# Patient Record
Sex: FEMALE | Race: ASIAN | NOT HISPANIC OR LATINO | URBAN - METROPOLITAN AREA
[De-identification: names, ages, dates, MRNs, and addresses within clinical notes are randomized per-mention and may not be internally consistent; named-entity substitution may affect disease eponyms.]

---

## 2017-04-09 ENCOUNTER — EMERGENCY (EMERGENCY)
Facility: HOSPITAL | Age: 27
LOS: 1 days | Discharge: HOME | End: 2017-04-09
Attending: EMERGENCY MEDICINE | Admitting: EMERGENCY MEDICINE
Payer: COMMERCIAL

## 2017-04-09 ENCOUNTER — INPATIENT (INPATIENT)
Facility: HOSPITAL | Age: 27
LOS: 1 days | Discharge: HOME | End: 2017-04-11
Attending: HOSPITALIST | Admitting: HOSPITALIST
Payer: COMMERCIAL

## 2017-04-09 VITALS
SYSTOLIC BLOOD PRESSURE: 134 MMHG | RESPIRATION RATE: 20 BRPM | OXYGEN SATURATION: 100 % | TEMPERATURE: 98 F | HEART RATE: 137 BPM | DIASTOLIC BLOOD PRESSURE: 81 MMHG

## 2017-04-09 VITALS
DIASTOLIC BLOOD PRESSURE: 85 MMHG | RESPIRATION RATE: 18 BRPM | OXYGEN SATURATION: 98 % | TEMPERATURE: 98 F | SYSTOLIC BLOOD PRESSURE: 121 MMHG | HEART RATE: 101 BPM

## 2017-04-09 DIAGNOSIS — J45.901 UNSPECIFIED ASTHMA WITH (ACUTE) EXACERBATION: ICD-10-CM

## 2017-04-09 LAB
ALBUMIN SERPL ELPH-MCNC: 4.6 G/DL — SIGNIFICANT CHANGE UP (ref 3.3–5)
ALP SERPL-CCNC: 73 U/L — SIGNIFICANT CHANGE UP (ref 40–120)
ALT FLD-CCNC: 31 U/L — SIGNIFICANT CHANGE UP (ref 4–33)
APTT BLD: 35.2 SEC — SIGNIFICANT CHANGE UP (ref 27.5–37.4)
AST SERPL-CCNC: 32 U/L — SIGNIFICANT CHANGE UP (ref 4–32)
BASOPHILS # BLD AUTO: 0.02 K/UL — SIGNIFICANT CHANGE UP (ref 0–0.2)
BASOPHILS NFR BLD AUTO: 0.1 % — SIGNIFICANT CHANGE UP (ref 0–2)
BILIRUB SERPL-MCNC: 0.2 MG/DL — SIGNIFICANT CHANGE UP (ref 0.2–1.2)
BUN SERPL-MCNC: 13 MG/DL — SIGNIFICANT CHANGE UP (ref 7–23)
CALCIUM SERPL-MCNC: 10 MG/DL — SIGNIFICANT CHANGE UP (ref 8.4–10.5)
CHLORIDE SERPL-SCNC: 99 MMOL/L — SIGNIFICANT CHANGE UP (ref 98–107)
CK SERPL-CCNC: 69 U/L — SIGNIFICANT CHANGE UP (ref 25–170)
CO2 SERPL-SCNC: 22 MMOL/L — SIGNIFICANT CHANGE UP (ref 22–31)
CREAT SERPL-MCNC: 0.65 MG/DL — SIGNIFICANT CHANGE UP (ref 0.5–1.3)
D DIMER BLD IA.RAPID-MCNC: 153 NG/ML — SIGNIFICANT CHANGE UP
EOSINOPHIL # BLD AUTO: 0.01 K/UL — SIGNIFICANT CHANGE UP (ref 0–0.5)
EOSINOPHIL NFR BLD AUTO: 0.1 % — SIGNIFICANT CHANGE UP (ref 0–6)
GLUCOSE SERPL-MCNC: 197 MG/DL — HIGH (ref 70–99)
HCT VFR BLD CALC: 44.7 % — SIGNIFICANT CHANGE UP (ref 34.5–45)
HGB BLD-MCNC: 15.4 G/DL — SIGNIFICANT CHANGE UP (ref 11.5–15.5)
IMM GRANULOCYTES NFR BLD AUTO: 0.2 % — SIGNIFICANT CHANGE UP (ref 0–1.5)
INR BLD: 1 — SIGNIFICANT CHANGE UP (ref 0.88–1.17)
LYMPHOCYTES # BLD AUTO: 1.19 K/UL — SIGNIFICANT CHANGE UP (ref 1–3.3)
LYMPHOCYTES # BLD AUTO: 7.1 % — LOW (ref 13–44)
MCHC RBC-ENTMCNC: 26.9 PG — LOW (ref 27–34)
MCHC RBC-ENTMCNC: 34.5 % — SIGNIFICANT CHANGE UP (ref 32–36)
MCV RBC AUTO: 78.1 FL — LOW (ref 80–100)
MONOCYTES # BLD AUTO: 0.59 K/UL — SIGNIFICANT CHANGE UP (ref 0–0.9)
MONOCYTES NFR BLD AUTO: 3.5 % — SIGNIFICANT CHANGE UP (ref 2–14)
NEUTROPHILS # BLD AUTO: 14.85 K/UL — HIGH (ref 1.8–7.4)
NEUTROPHILS NFR BLD AUTO: 89 % — HIGH (ref 43–77)
PLATELET # BLD AUTO: 264 K/UL — SIGNIFICANT CHANGE UP (ref 150–400)
PMV BLD: 10.4 FL — SIGNIFICANT CHANGE UP (ref 7–13)
POTASSIUM SERPL-MCNC: 4.1 MMOL/L — SIGNIFICANT CHANGE UP (ref 3.5–5.3)
POTASSIUM SERPL-SCNC: 4.1 MMOL/L — SIGNIFICANT CHANGE UP (ref 3.5–5.3)
PROT SERPL-MCNC: 8.9 G/DL — HIGH (ref 6–8.3)
PROTHROM AB SERPL-ACNC: 11.2 SEC — SIGNIFICANT CHANGE UP (ref 9.8–13.1)
RBC # BLD: 5.72 M/UL — HIGH (ref 3.8–5.2)
RBC # FLD: 14.8 % — HIGH (ref 10.3–14.5)
SODIUM SERPL-SCNC: 143 MMOL/L — SIGNIFICANT CHANGE UP (ref 135–145)
WBC # BLD: 16.69 K/UL — HIGH (ref 3.8–10.5)
WBC # FLD AUTO: 16.69 K/UL — HIGH (ref 3.8–10.5)

## 2017-04-09 PROCEDURE — 99283 EMERGENCY DEPT VISIT LOW MDM: CPT | Mod: 25

## 2017-04-09 PROCEDURE — 71020: CPT | Mod: 26

## 2017-04-09 RX ORDER — SODIUM CHLORIDE 9 MG/ML
1000 INJECTION INTRAMUSCULAR; INTRAVENOUS; SUBCUTANEOUS ONCE
Qty: 0 | Refills: 0 | Status: COMPLETED | OUTPATIENT
Start: 2017-04-09 | End: 2017-04-09

## 2017-04-09 RX ORDER — ACETAMINOPHEN 500 MG
650 TABLET ORAL ONCE
Qty: 0 | Refills: 0 | Status: COMPLETED | OUTPATIENT
Start: 2017-04-09 | End: 2017-04-09

## 2017-04-09 RX ORDER — IPRATROPIUM/ALBUTEROL SULFATE 18-103MCG
3 AEROSOL WITH ADAPTER (GRAM) INHALATION ONCE
Qty: 0 | Refills: 0 | Status: COMPLETED | OUTPATIENT
Start: 2017-04-09 | End: 2017-04-09

## 2017-04-09 RX ADMIN — Medication 40 MILLIGRAM(S): at 21:33

## 2017-04-09 RX ADMIN — Medication 3 MILLILITER(S): at 21:33

## 2017-04-09 RX ADMIN — SODIUM CHLORIDE 1000 MILLILITER(S): 9 INJECTION INTRAMUSCULAR; INTRAVENOUS; SUBCUTANEOUS at 21:30

## 2017-04-09 RX ADMIN — Medication 650 MILLIGRAM(S): at 14:47

## 2017-04-09 NOTE — ED ADULT NURSE REASSESSMENT NOTE - NS ED NURSE REASSESS COMMENT FT1
Pt. resting comfortably in bed, continues to report 10/10 L sided chest wall pain, MD made aware, pt. medicated as ordered.  Vital signs stable. Pt. awaiting admission to the hospital.  Will continue to monitor.

## 2017-04-09 NOTE — ED PROVIDER NOTE - OBJECTIVE STATEMENT
28yo F with a hx of asthma p/w SOB. Pt was here earlier today with 1d of URI sx's and R lower pleuritic pain. Pt had a negative CXR, was given tylenol and dc'd on albuterol. She is already on medrol dosepak and albuterol per urgent care. No abd pain, n/v/d. No fevers/chills. No hx of LE swelling, hx of DVT/PE, recent travel, calf pain. +OCPs (loestrin). Pt took one dose of albuterol at home after she got home from the ED but continued to have SOB, so she decided to come in. Received 2 duonebs with EMS. Pt is very anxious and states her mother  on a ventilator a year ago.

## 2017-04-09 NOTE — ED PROVIDER NOTE - ATTENDING CONTRIBUTION TO CARE
I performed a face to face evaluation of this patient and performed a history and physical examination on the patient.  I agree with the resident's history, physical examination, and plan of the patient. patient complaining of sob.  wheezing on exam. 2nd visit in day.  acute asthma exacerbation failed outpatient therapy. admit.

## 2017-04-09 NOTE — ED PROVIDER NOTE - DETAILS:
The scribe's documentation has been prepared under my direction and personally reviewed by me in its entirety. I confirm that the note above accurately reflects all work, treatment, procedures, and medical decision making performed by me (Dr. Mendoza).

## 2017-04-09 NOTE — ED ADULT NURSE NOTE - CHIEF COMPLAINT QUOTE
Pt recently diagnosed with pleuritis, now coming from home with asthma exacerbation with productive cough.  Pt given Albuterol x2 by EMS.  Pt tachycardic in triage.

## 2017-04-09 NOTE — ED ADULT NURSE NOTE - CHIEF COMPLAINT QUOTE
p/t with hx asthma c/o of cough cold and chest congestion for few days p/t currently being treated for bronchitis 2 neb treatments given by ems

## 2017-04-09 NOTE — ED PROVIDER NOTE - PLAN OF CARE
You were seen today for your asthma exacerbation and pleurisy.  Continue to use albuterol as needed for wheezing.  Take the prescribed steroids until you feel better.  Follow up with your primary care physician in 2-3 days for re-evaluation.  Call a pulmonologist from the referral list to be seen at the next available appointment.  RETURN TO THE EMERGENCY DEPARTMENT IMMEDIATELY IF YOU FEEL SEVERE SHORTNESS OF BREATH OR FOR ANY OTHER CONCERN.

## 2017-04-09 NOTE — ED PROVIDER NOTE - CARE PLAN
Principal Discharge DX:	Asthma  Secondary Diagnosis:	Pleurisy Principal Discharge DX:	Asthma  Instructions for follow-up, activity and diet:	You were seen today for your asthma exacerbation and pleurisy.  Continue to use albuterol as needed for wheezing.  Take the prescribed steroids until you feel better.  Follow up with your primary care physician in 2-3 days for re-evaluation.  Call a pulmonologist from the referral list to be seen at the next available appointment.  RETURN TO THE EMERGENCY DEPARTMENT IMMEDIATELY IF YOU FEEL SEVERE SHORTNESS OF BREATH OR FOR ANY OTHER CONCERN.  Secondary Diagnosis:	Pleurisy

## 2017-04-09 NOTE — ED PROVIDER NOTE - MEDICAL DECISION MAKING DETAILS
Pt p/w asthma exacerbation, second visit today. CXR negative for PNA. On medrol dosepak. O2 sat is ~95%. Will obtain basic labs, dimer, duonebs, steroids, CDU vs admit.

## 2017-04-09 NOTE — ED ADULT NURSE NOTE - OBJECTIVE STATEMENT
RN Facilitator. Pt received to room 9 for SOB, chest tightness and wheezing. Pt was seen in ED today for productive cough and pain 'right under R breast'. Pt diagnosed with pleuritis, discharged home and returned via ambulance for worsening SOB, chest tightness, and wheezing. Pt received awake, A&Ox4, pt denies lightheadedness, dizziness, weakness. Pt reporting SOB, pt intermittently tachypenic, breath sounds with wheezes and ronchi bilaterally. Pt denies chest pain, reporting 9/10 sharp pain under R breast, pt reports pain increases with movement and coughing. Pt denies N/V/D or dysuria. Cardiac monitor in place showing ST HR up to 140s, continuous pulse oxygenation showing 96% on RA.  MD TORSTEN Phillips aware. VS documented per flow. 20g PIV placed in R AC, labs drawn and sent per orders. NS bolus infusing per orders. Father at bedside. Safety maintained, report endorsed to Primary RN Nena.

## 2017-04-09 NOTE — ED PROVIDER NOTE - MEDICAL DECISION MAKING DETAILS
27y F with PMHx of asthma presents with URI symptoms. Pleuritic chest pain likely pleurisy vs costochondritis. Doubt PE as no hx of DVT or PE. no evidence of DVT on exam. Obtain CXR to r/o pneumothorax, Tylenol for pain and reassess. 27y F with PMHx of asthma presents with URI symptoms. Pleuritic chest pain likely pleurisy vs costochondritis. Doubt PE as no hx of DVT or PE. no evidence of DVT on exam, ttp, no hypoxia, no tachypnea. Obtain CXR to r/o pneumothorax, Tylenol for pain and reassess.

## 2017-04-09 NOTE — ED PROVIDER NOTE - OBJECTIVE STATEMENT
27y F with PMHx of asthma presents to the ED with dry cough, nasal congestion, and sore throat x 3 days. States symptoms are gradually worsening despite seeing PMD and starting on steroid, Amoxicillin, and antitussive agent. Pt states cough is dry and she has clear nasal congestion. No difficulty swallowing food. Pt endorses right sided chest pain when coughing located under right breast. Was not able to start taking steroids until today because she forgot medication. Denies hx of DVT or PE, leg pain, leg swelling, recent immobilization, surgery, or long travel.

## 2017-04-09 NOTE — ED PROVIDER NOTE - NS ED MD SCRIBE ATTENDING SCRIBE SECTIONS
DISPOSITION/PAST MEDICAL/SURGICAL/SOCIAL HISTORY/HIV/HISTORY OF PRESENT ILLNESS/REVIEW OF SYSTEMS/VITAL SIGNS( Pullset)/PHYSICAL EXAM

## 2017-04-10 ENCOUNTER — TRANSCRIPTION ENCOUNTER (OUTPATIENT)
Age: 27
End: 2017-04-10

## 2017-04-10 DIAGNOSIS — E86.0 DEHYDRATION: ICD-10-CM

## 2017-04-10 DIAGNOSIS — R65.10 SYSTEMIC INFLAMMATORY RESPONSE SYNDROME (SIRS) OF NON-INFECTIOUS ORIGIN WITHOUT ACUTE ORGAN DYSFUNCTION: ICD-10-CM

## 2017-04-10 DIAGNOSIS — D72.829 ELEVATED WHITE BLOOD CELL COUNT, UNSPECIFIED: ICD-10-CM

## 2017-04-10 DIAGNOSIS — R07.9 CHEST PAIN, UNSPECIFIED: ICD-10-CM

## 2017-04-10 DIAGNOSIS — Z98.891 HISTORY OF UTERINE SCAR FROM PREVIOUS SURGERY: Chronic | ICD-10-CM

## 2017-04-10 DIAGNOSIS — A41.89 OTHER SPECIFIED SEPSIS: ICD-10-CM

## 2017-04-10 DIAGNOSIS — J45.901 UNSPECIFIED ASTHMA WITH (ACUTE) EXACERBATION: ICD-10-CM

## 2017-04-10 LAB

## 2017-04-10 PROCEDURE — 99223 1ST HOSP IP/OBS HIGH 75: CPT | Mod: GC

## 2017-04-10 RX ORDER — ENOXAPARIN SODIUM 100 MG/ML
40 INJECTION SUBCUTANEOUS EVERY 24 HOURS
Qty: 0 | Refills: 0 | Status: DISCONTINUED | OUTPATIENT
Start: 2017-04-10 | End: 2017-04-10

## 2017-04-10 RX ORDER — LIDOCAINE 4 G/100G
1 CREAM TOPICAL DAILY
Qty: 0 | Refills: 0 | Status: DISCONTINUED | OUTPATIENT
Start: 2017-04-10 | End: 2017-04-11

## 2017-04-10 RX ORDER — KETOROLAC TROMETHAMINE 30 MG/ML
30 SYRINGE (ML) INJECTION ONCE
Qty: 0 | Refills: 0 | Status: DISCONTINUED | OUTPATIENT
Start: 2017-04-10 | End: 2017-04-10

## 2017-04-10 RX ORDER — IPRATROPIUM/ALBUTEROL SULFATE 18-103MCG
3 AEROSOL WITH ADAPTER (GRAM) INHALATION EVERY 4 HOURS
Qty: 0 | Refills: 0 | Status: DISCONTINUED | OUTPATIENT
Start: 2017-04-10 | End: 2017-04-11

## 2017-04-10 RX ORDER — IBUPROFEN 200 MG
600 TABLET ORAL EVERY 8 HOURS
Qty: 0 | Refills: 0 | Status: DISCONTINUED | OUTPATIENT
Start: 2017-04-10 | End: 2017-04-10

## 2017-04-10 RX ORDER — SODIUM CHLORIDE 0.65 %
1 AEROSOL, SPRAY (ML) NASAL
Qty: 0 | Refills: 0 | Status: DISCONTINUED | OUTPATIENT
Start: 2017-04-10 | End: 2017-04-11

## 2017-04-10 RX ORDER — DIPHENHYDRAMINE HCL 50 MG
50 CAPSULE ORAL ONCE
Qty: 0 | Refills: 0 | Status: COMPLETED | OUTPATIENT
Start: 2017-04-10 | End: 2017-04-10

## 2017-04-10 RX ORDER — LIDOCAINE 4 G/100G
1 CREAM TOPICAL ONCE
Qty: 0 | Refills: 0 | Status: COMPLETED | OUTPATIENT
Start: 2017-04-10 | End: 2017-04-10

## 2017-04-10 RX ORDER — IPRATROPIUM/ALBUTEROL SULFATE 18-103MCG
3 AEROSOL WITH ADAPTER (GRAM) INHALATION EVERY 6 HOURS
Qty: 0 | Refills: 0 | Status: DISCONTINUED | OUTPATIENT
Start: 2017-04-10 | End: 2017-04-10

## 2017-04-10 RX ORDER — BUDESONIDE AND FORMOTEROL FUMARATE DIHYDRATE 160; 4.5 UG/1; UG/1
2 AEROSOL RESPIRATORY (INHALATION)
Qty: 0 | Refills: 0 | Status: DISCONTINUED | OUTPATIENT
Start: 2017-04-10 | End: 2017-04-11

## 2017-04-10 RX ADMIN — Medication 30 MILLIGRAM(S): at 12:55

## 2017-04-10 RX ADMIN — Medication 3 MILLILITER(S): at 17:45

## 2017-04-10 RX ADMIN — Medication 3 MILLILITER(S): at 08:45

## 2017-04-10 RX ADMIN — LIDOCAINE 1 PATCH: 4 CREAM TOPICAL at 08:29

## 2017-04-10 RX ADMIN — Medication 50 MILLIGRAM(S): at 22:31

## 2017-04-10 RX ADMIN — LIDOCAINE 1 PATCH: 4 CREAM TOPICAL at 17:59

## 2017-04-10 RX ADMIN — LIDOCAINE 1 PATCH: 4 CREAM TOPICAL at 03:30

## 2017-04-10 RX ADMIN — Medication 100 MILLIGRAM(S): at 09:36

## 2017-04-10 RX ADMIN — Medication 100 MILLIGRAM(S): at 03:30

## 2017-04-10 RX ADMIN — Medication 1 SPRAY(S): at 21:50

## 2017-04-10 RX ADMIN — Medication 30 MILLIGRAM(S): at 13:10

## 2017-04-10 RX ADMIN — BUDESONIDE AND FORMOTEROL FUMARATE DIHYDRATE 2 PUFF(S): 160; 4.5 AEROSOL RESPIRATORY (INHALATION) at 21:51

## 2017-04-10 RX ADMIN — Medication 3 MILLILITER(S): at 14:27

## 2017-04-10 RX ADMIN — Medication 3 MILLILITER(S): at 03:11

## 2017-04-10 RX ADMIN — ENOXAPARIN SODIUM 40 MILLIGRAM(S): 100 INJECTION SUBCUTANEOUS at 05:30

## 2017-04-10 RX ADMIN — Medication 100 MILLIGRAM(S): at 21:49

## 2017-04-10 RX ADMIN — Medication 3 MILLILITER(S): at 21:20

## 2017-04-10 RX ADMIN — Medication 40 MILLIGRAM(S): at 05:30

## 2017-04-10 NOTE — DISCHARGE NOTE ADULT - PLAN OF CARE
Stabilization of symptoms. -Please continue to use your newly prescribed Symbicort inhaler 2 times per day.   -Please follow up with your primary care doctor and setup an appointment with the Pulmonary clinic in for your asthma.   -Please return back to the ED if your symptoms worsen. -Please continue to use your newly prescribed Symbicort inhaler 2 times per day.   -You were given a prescription for a nebulizer and treatments; please use this as needed for wheezing and/or difficulty breathing up to 4 times in one day.  -Please follow up with your primary care doctor and setup an appointment with the Pulmonary clinic in for your asthma.   -Please return back to the ED if your symptoms worsen.

## 2017-04-10 NOTE — H&P ADULT. - PROBLEM SELECTOR PLAN 2
CP likely musculoskeletal etiology. Unlikely PE given negative D-dimer. Given young age & lack of other comorbidities, do not suspect cardiac etiology.  -EKG pending  -Pain control Pt likely in asthma exacerbation given persistent cough, SOB. Likely triggered by viral URI given +sick contacts & no evidence of PNA on CXR.  -DuoNebs standing  -Prednisone taper  -Peak flows daily  -RVP pending  -May benefit from inhaled corticosteroid to minimize exacerbation Pt likely in asthma exacerbation given persistent cough, SOB. Likely triggered by viral URI given +sick contacts & non compliance with maintenance Advair no evidence of PNA on CXR.  -DuoNebs standing  -Prednisone taper  -Peak flows daily  -RVP (+)  -Dolores --> Symbicort while inpatient   -frequent pulse-ox checks

## 2017-04-10 NOTE — H&P ADULT. - RESPIRATORY COMMENTS
Mild, scattered expiratory wheeze throughout both lung fields scattered fine expiratory wheeze throughout both lung fields

## 2017-04-10 NOTE — H&P ADULT. - LYMPHATIC
posterior cervical R/supraclavicular L/anterior cervical R/posterior cervical L/supraclavicular R/anterior cervical L

## 2017-04-10 NOTE — DISCHARGE NOTE ADULT - CARE PLAN
Principal Discharge DX:	Asthma exacerbation  Goal:	Stabilization of symptoms.  Instructions for follow-up, activity and diet:	-Please continue to use your newly prescribed Symbicort inhaler 2 times per day.   -Please follow up with your primary care doctor and setup an appointment with the Pulmonary clinic in for your asthma.   -Please return back to the ED if your symptoms worsen. Principal Discharge DX:	Asthma exacerbation  Goal:	Stabilization of symptoms.  Instructions for follow-up, activity and diet:	-Please continue to use your newly prescribed Symbicort inhaler 2 times per day.   -You were given a prescription for a nebulizer and treatments; please use this as needed for wheezing and/or difficulty breathing up to 4 times in one day.  -Please follow up with your primary care doctor and setup an appointment with the Pulmonary clinic in for your asthma.   -Please return back to the ED if your symptoms worsen.

## 2017-04-10 NOTE — DISCHARGE NOTE ADULT - CONDITIONS AT DISCHARGE
Patient a&ox4, VSS, no acute distress noted. No c/o of pain at this time. Skin intact. Patient to be d/c home.

## 2017-04-10 NOTE — H&P ADULT. - NEGATIVE OPHTHALMOLOGIC SYMPTOMS
no blurred vision L/no blurred vision R no diplopia/no blurred vision L/no photophobia/no blurred vision R

## 2017-04-10 NOTE — DISCHARGE NOTE ADULT - MEDICATION SUMMARY - MEDICATIONS TO TAKE
I will START or STAY ON the medications listed below when I get home from the hospital:    Nebulizer  -- 1 kit  -- Indication: For Asthma exacerbation    predniSONE 20 mg oral tablet  -- 2 tab(s) by mouth once a day  -- It is very important that you take or use this exactly as directed.  Do not skip doses or discontinue unless directed by your doctor.  Obtain medical advice before taking any non-prescription drugs as some may affect the action of this medication.  Take with food or milk.    -- Indication: For Asthma exacerbation    naproxen 500 mg oral tablet  -- 1 tab(s) by mouth 2 times a day, As Needed -for moderate pain  -- Check with your doctor before becoming pregnant.  May cause drowsiness or dizziness.  Obtain medical advice before taking any non-prescription drugs as some may affect the action of this medication.  Take with food or milk.    -- Indication: For Chest pain    budesonide-formoterol 160 mcg-4.5 mcg/inh inhalation aerosol  -- 2 puff(s) inhaled 2 times a day  -- Indication: For Asthma exacerbation    albuterol 90 mcg/inh inhalation aerosol  -- 2 puff(s) inhaled 4 times a day, As Needed  -- Indication: For Asthma exacerbation    albuterol-ipratropium 2.5 mg-0.5 mg/3 mL inhalation solution  -- 3 milliliter(s) inhaled every 4 hours as needed  -- Indication: For Asthma exacerbation    guaiFENesin 100 mg/5 mL oral liquid  -- 5 milliliter(s) by mouth every 6 hours, As needed, Cough  -- Indication: For Coughing    Low-Ogestrel  --  by mouth   -- Indication: For Birth control I will START or STAY ON the medications listed below when I get home from the hospital:    Nebulizer  -- 1 kit  -- Indication: For Asthma exacerbation    predniSONE 20 mg oral tablet  -- 2 tab(s) by mouth once a day x 3 days  -- It is very important that you take or use this exactly as directed.  Do not skip doses or discontinue unless directed by your doctor.  Obtain medical advice before taking any non-prescription drugs as some may affect the action of this medication.  Take with food or milk.    -- Indication: For Asthma exacerbation    naproxen 500 mg oral tablet  -- 1 tab(s) by mouth 2 times a day, As Needed -for moderate pain  -- Check with your doctor before becoming pregnant.  May cause drowsiness or dizziness.  Obtain medical advice before taking any non-prescription drugs as some may affect the action of this medication.  Take with food or milk.    -- Indication: For Chest pain    albuterol 90 mcg/inh inhalation aerosol  -- 2 puff(s) inhaled 4 times a day, As Needed  -- Indication: For Asthma exacerbation    albuterol-ipratropium 2.5 mg-0.5 mg/3 mL inhalation solution  -- 3 milliliter(s) inhaled every 4 hours as needed  -- Indication: For Asthma exacerbation    budesonide-formoterol 160 mcg-4.5 mcg/inh inhalation aerosol  -- 2 puff(s) inhaled 2 times a day  -- Indication: For Asthma exacerbation    guaiFENesin 100 mg/5 mL oral liquid  -- 5 milliliter(s) by mouth every 6 hours, As needed, Cough  -- Indication: For Coughing    Low-Ogestrel  --  by mouth   -- Indication: For Birth control    homatropine-HYDROcodone 1.5 mg-5 mg/5 mL oral syrup  -- 5 milliliter(s) by mouth every 6 hours, As needed, Cough MDD:20 ml  -- Indication: For Coughinh I will START or STAY ON the medications listed below when I get home from the hospital:    Nebulizer  -- 1 kit  -- Indication: For Asthma exacerbation    predniSONE 20 mg oral tablet  -- 2 tab(s) by mouth once a day x 3 days  -- It is very important that you take or use this exactly as directed.  Do not skip doses or discontinue unless directed by your doctor.  Obtain medical advice before taking any non-prescription drugs as some may affect the action of this medication.  Take with food or milk.    -- Indication: For Asthma exacerbation    naproxen 500 mg oral tablet  -- 1 tab(s) by mouth 2 times a day, As Needed -for moderate pain  -- Check with your doctor before becoming pregnant.  May cause drowsiness or dizziness.  Obtain medical advice before taking any non-prescription drugs as some may affect the action of this medication.  Take with food or milk.    -- Indication: For Chest pain    ProAir HFA 90 mcg/inh inhalation aerosol  -- 2 puff(s) inhaled every 6 hours as needed  -- For inhalation only.  It is very important that you take or use this exactly as directed.  Do not skip doses or discontinue unless directed by your doctor.  Obtain medical advice before taking any non-prescription drugs as some may affect the action of this medication.  Shake well before use.    -- Indication: For Asthma exacerbation    Symbicort 160 mcg-4.5 mcg/inh inhalation aerosol  -- 2 puff(s) inhaled 2 times a day  -- Check with your doctor before becoming pregnant.  For inhalation only.  Rinse mouth thoroughly after use.    -- Indication: For Asthma exacerbation    albuterol-ipratropium 2.5 mg-0.5 mg/3 mL inhalation solution  -- 3 milliliter(s) inhaled every 4 hours as needed  -- Indication: For Asthma exacerbation    guaiFENesin 100 mg/5 mL oral liquid  -- 5 milliliter(s) by mouth every 6 hours, As needed, Cough  -- Indication: For Coughing    Low-Ogestrel  --  by mouth   -- Indication: For Birth control    homatropine-HYDROcodone 1.5 mg-5 mg/5 mL oral syrup  -- 5 milliliter(s) by mouth every 6 hours, As needed, Cough MDD:20 ml  -- Indication: For Chest pain

## 2017-04-10 NOTE — H&P ADULT. - PROBLEM SELECTOR PLAN 3
Leukocytosis likely 2/2 steroid use. No fevers or evidence of PNA.  -Trend CBC CP likely musculoskeletal etiology. Unlikely PE given negative D-dimer. Given young age & lack of other comorbidities, do not suspect cardiac etiology.  -EKG pending  -Pain control CP likely musculoskeletal etiology given that reproducible. Unlikely PE given negative D-dimer. Given young age & lack of other comorbidities, do not suspect cardiac etiology. No evidence of pneumothorax.   -EKG no acute changes   -Pain control  -f/u official CXR report

## 2017-04-10 NOTE — DISCHARGE NOTE ADULT - CARE PROVIDER_API CALL
Clinic, Pulmonary  [address] [address]  Phone: [phone]  Fax: [phone]    - Maegan Newberry), Critical Care Medicine; Internal Medicine; Pulmonary Disease; Sleep Medicine  [address] [place] [address]  Phone: [phone]  Fax: [phone]

## 2017-04-10 NOTE — H&P ADULT. - PROBLEM SELECTOR PLAN 1
Pt likely in asthma exacerbation given persistent cough, SOB. Likely triggered by viral URI.  -DuoNebs standing  -Prednisone taper  -Peak flows daily Pt likely in asthma exacerbation given persistent cough, SOB. Likely triggered by viral URI given +sick contacts & no evidence of PNA on CXR.  -DuoNebs standing  -Prednisone taper  -Peak flows daily Pt likely in asthma exacerbation given persistent cough, SOB. Likely triggered by viral URI given +sick contacts & no evidence of PNA on CXR.  -DuoNebs standing  -Prednisone taper  -Peak flows daily  -RVP pending  -May benefit from inhaled corticosteroid to minimize exacerbation Meets SIRS criteria given leukocytosis & tachycardia.  Leukocytosis likely related to steroids use, tachycardia likely multifactorial from DuoNebs, respiratory distress.  -Monitor off abx for now  -Check UA leukocytosis & tachycardia, RVP (+)  -Monitor off abx   -Check UA  Supportive care leukocytosis & tachycardia, RVP (+)  -Monitor off abx   -Check UA  -Supportive care

## 2017-04-10 NOTE — DISCHARGE NOTE ADULT - HOSPITAL COURSE
Admission course  28yo Female hx asthma p/w non-productive cough, SOB, R-sided CP for 1-2 days.  Pt reports dry cough, nasal congestion, SOB that started on 4/8.  She went to an urgent care center  where she was started on steroid taper (?methylprednisone), amoxicillin & anti-tussives.  Pt reports that the cough became worse that night & she developed pain under her R-breast.  Due to persistent SOB & the CP, she came to ED on 4/9. She had CXR that was negative & was d/c'ed on naproxen. Pt returned to ER the same night due to persistent SOB & CP. She denies any fevers, chills abd pain, N, V.  Her brother who lives at home w/ her has been having a cough & she believes he might have been diagnosed w/ the flu. Pt was diagnosed w/ asthma based on clinical sx at age 17.  She has been hospitalized for exacerbation once every 1-2 years.  She has never been intubated before.  She typically uses albuterol PRN 1-2/week. Of note, the pt says that she has Advair at home but uses it "sometimes", did not know that it is a daily medication. Unable to recall the name of her PMD. The pt is in menstruation period currently.    ED course: T 98.3, , /81, RR 20, O2 100% RA.  Labs remarkable for WBC 16.7, D-dimer 153. +RVP for rhinovirus. CXR: clear lungs bilaterally. Received 1L NS, DuoNeb, prednisone 40mg, percocet.   Chest XRAY:       Hospital Course  The patient was continued on DuoNebs Q4H, prednisone 40 mg QD and Symbicort. The patient continued to complain of coughing and wheezing however improved from admission. Her chest pain was initially treated with lidocaine patch and percocet however, the patient did not experience any relief with this regimen. Lidocaine patch and percocet were discontinued and the patient was given Toradol 30 mg IVP x1 for right sided musculoskeletal chest pain. The patient was discharged home after improvement in her symptoms with Symbicort and instructed to take Naproxen that was previously prescribed from the ED. The patient was also instructed to follow up with her PMD and to make an appointment with the Pulmonology clinic in order to undergo further pulmonary testing. Admission course  28yo Female hx asthma p/w non-productive cough, SOB, R-sided CP for 1-2 days.  Pt reports dry cough, nasal congestion, SOB that started on 4/8.  She went to an urgent care center  where she was started on steroid taper (?methylprednisone), amoxicillin & anti-tussives.  Pt reports that the cough became worse that night & she developed pain under her R-breast.  Due to persistent SOB & the CP, she came to ED on 4/9. She had CXR that was negative & was d/c'ed on naproxen. Pt returned to ER the same night due to persistent SOB & CP. She denies any fevers, chills abd pain, N, V.  Her brother who lives at home w/ her has been having a cough & she believes he might have been diagnosed w/ the flu. Pt was diagnosed w/ asthma based on clinical sx at age 17.  She has been hospitalized for exacerbation once every 1-2 years.  She has never been intubated before.  She typically uses albuterol PRN 1-2/week. Of note, the pt says that she has Advair at home but uses it "sometimes", did not know that it is a daily medication. Unable to recall the name of her PMD. The pt is in menstruation period currently.    ED course: T 98.3, , /81, RR 20, O2 100% RA.  Labs remarkable for WBC 16.7, D-dimer 153. +RVP for rhinovirus. CXR: clear lungs bilaterally. Received 1L NS, DuoNeb, prednisone 40mg, percocet.   Chest XRAY:       Hospital Course  The patient was continued on DuoNebs Q4H, prednisone 40 mg QD and Symbicort. The patient continued to complain of coughing and wheezing however improved from admission. Her chest pain was initially treated with lidocaine patch and percocet however, the patient did not experience any relief with this regimen. Lidocaine patch and percocet were discontinued and the patient was given Toradol 30 mg IVP x1 for right sided musculoskeletal chest pain. Naproxen PRN was added. The patient was discharged home after improvement in her symptoms with Symbicort and instructed to take Naproxen that was previously prescribed from the ED. The patient was also instructed to follow up with her PMD and to make an appointment with the Pulmonology clinic in order to undergo further pulmonary testing. Admission course  26yo Female hx asthma p/w non-productive cough, SOB, R-sided CP for 1-2 days.  Pt reports dry cough, nasal congestion, SOB that started on 4/8.  She went to an urgent care center  where she was started on steroid taper (?methylprednisone), amoxicillin & anti-tussives.  Pt reports that the cough became worse that night & she developed pain under her R-breast.  Due to persistent SOB & the CP, she came to ED on 4/9. She had CXR that was negative & was d/c'ed on naproxen. Pt returned to ER the same night due to persistent SOB & CP. She denies any fevers, chills abd pain, N, V.  Her brother who lives at home w/ her has been having a cough & she believes he might have been diagnosed w/ the flu. Pt was diagnosed w/ asthma based on clinical sx at age 17.  She has been hospitalized for exacerbation once every 1-2 years.  She has never been intubated before.  She typically uses albuterol PRN 1-2/week. Of note, the pt says that she has Advair at home but uses it "sometimes", did not know that it is a daily medication. Unable to recall the name of her PMD. The pt is in menstruation period currently.    ED course: T 98.3, , /81, RR 20, O2 100% RA.  Labs remarkable for WBC 16.7, D-dimer 153. +RVP for rhinovirus. CXR: clear lungs bilaterally. Received 1L NS, DuoNeb, prednisone 40mg, percocet.   Chest XRAY:       Hospital Course  The patient was continued on DuoNebs Q4H, prednisone 40 mg QD and Symbicort. The patient continued to complain of coughing and wheezing however improved from admission. Her chest pain was initially treated with lidocaine patch and percocet however, the patient did not experience any relief with this regimen. Lidocaine patch and percocet were discontinued and the patient was given Toradol 30 mg IVP x1 for right sided musculoskeletal chest pain. Naproxen PRN was added. The patient was discharged home after improvement in her symptoms with Symbicort and instructed to take Naproxen that was previously prescribed from the ED. She was prescribed a 3 day supply of prednisone for a total of 5 days on prednisone 40 mg. The patient was also instructed to follow up with her PMD and to make an appointment with the Pulmonology clinic in order to undergo further pulmonary testing. Admission course  26yo Female hx asthma p/w non-productive cough, SOB, R-sided CP for 1-2 days.  Pt reports dry cough, nasal congestion, SOB that started on 4/8.  She went to an urgent care center  where she was started on steroid taper (?methylprednisone), amoxicillin & anti-tussives.  Pt reports that the cough became worse that night & she developed pain under her R-breast.  Due to persistent SOB & the CP, she came to ED on 4/9. She had CXR that was negative & was d/c'ed on naproxen. Pt returned to ER the same night due to persistent SOB & CP. She denies any fevers, chills abd pain, N, V.  Her brother who lives at home w/ her has been having a cough & she believes he might have been diagnosed w/ the flu. Pt was diagnosed w/ asthma based on clinical sx at age 17.  She has been hospitalized for exacerbation once every 1-2 years.  She has never been intubated before.  She typically uses albuterol PRN 1-2/week. Of note, the pt says that she has Advair at home but uses it "sometimes", did not know that it is a daily medication. Unable to recall the name of her PMD. The pt is in menstruation period currently.    ED course: T 98.3, , /81, RR 20, O2 100% RA.  Labs remarkable for WBC 16.7, D-dimer 153. +RVP for rhinovirus. CXR: clear lungs bilaterally. Received 1L NS, DuoNeb, prednisone 40mg, percocet.   Chest XRAY:       Hospital Course  The patient was continued on DuoNebs Q4H, prednisone 40 mg QD and Symbicort. The patient continued to complain of coughing and wheezing however improved from admission. The patient was given lidocaine patch and percocet for her R sided chest pain however, she did not experience any relief with this regimen. Lidocaine patch and percocet were discontinued and the patient was given Toradol 30 mg IVP x1 however with no relief. Naproxen Q8H PRN was added however the patient did not require any PRNs. On the day of discharge, she reported decreased cough, improved breathing and unchanged right sided chest pain; however on exam there was no tenderness elicited on palpation. Given the improvement of pain, reproducibility on exam, non-elevated D-dimer and stable VS, chest CT/CTA was not performed due to low probability of PE. The patient was discharged home with prescriptions for 3 more days of prednisone 40 mg, Symbicort, albuterol inhaler, duonebs and a nebulizer. The patient was instructed to take Naproxen and Robitussin that was previously prescribed from the ED. She was also also instructed to follow up with her PMD and to make an appointment with the Pulmonology clinic in order to undergo formal pulmonary function testing.

## 2017-04-10 NOTE — DISCHARGE NOTE ADULT - PROVIDER TOKENS
FREE:[LAST:[Clinic],FIRST:[Pulmonary],PHONE:[phone]],FAX:[phone]    -],ADDRESS:[[address] [place] [address]]] TOKEN:'[id number]'

## 2017-04-10 NOTE — DISCHARGE NOTE ADULT - MEDICATION SUMMARY - MEDICATIONS TO STOP TAKING
I will STOP taking the medications listed below when I get home from the hospital:    cephalexin 500 mg oral capsule  -- 1 cap(s) by mouth 2 times a day  -- Finish all this medication unless otherwise directed by prescriber.    Advair Diskus 250 mcg-50 mcg inhalation powder  -- 1 puff(s) inhaled 2 times a day

## 2017-04-10 NOTE — H&P ADULT. - ASSESSMENT
27F hx asthma p/w persistent SOB despite steroids, albuterol, returning to hospital after ED visit on 4/10 for persistent SOB, admitted for asthma exacerbation 27F hx asthma p/w cough, SOB, R-sided CP, s/p urgent care visit on 4/8 where she was given abx & steroids, returning to hospital after ED visit on 4/10 for persistent SOB & CP, admitted for asthma exacerbation 27F hx asthma p/w cough, SOB, R-sided CP, s/p urgent care visit on 4/8 where she was given abx & steroids, returning to hospital after ED visit on 4/10 for persistent SOB & CP a/w viral sepsis c/b dehydration and asthma exacerbation due to combination of viral URI and noncompliance with Advair; 28yo Female with hx asthma p/w cough, SOB, R-sided CP, s/p urgent care visit on 4/8 where she was given abx & steroids, returning to hospital after ED visit on 4/10 for persistent SOB & CP a/w viral sepsis c/b dehydration and asthma exacerbation due to combination of viral URI and noncompliance with Advair;

## 2017-04-10 NOTE — H&P ADULT. - FAMILY HISTORY
No pertinent family history in first degree relatives Mother  Still living? No  Family history of non-Hodgkin's lymphoma, Age at diagnosis: Age Unknown

## 2017-04-10 NOTE — DISCHARGE NOTE ADULT - CARE PROVIDERS DIRECT ADDRESSES
,DirectAddress_Unknown,DirectAddress_Unknown ,[email or web address],DirectAddress_Unknown

## 2017-04-10 NOTE — H&P ADULT. - MUSCULOSKELETAL
details… detailed exam no joint swelling/no calf tenderness/normal strength/no joint erythema/ROM intact/no joint warmth

## 2017-04-10 NOTE — DISCHARGE NOTE ADULT - PATIENT PORTAL LINK FT
“You can access the Follow[facility] Patient Portal, offered by [facility], by registering with the following website: [email or web address]”

## 2017-04-10 NOTE — H&P ADULT. - HISTORY OF PRESENT ILLNESS
27F hx asthma p/w persistent SOB.  Pt has been having non-productive cough, nasal congestion & SOB for several days.  She went to urgent care center where she was started on sterids, abx & anti-tussives.  Pt also reporting R-sided CP that is worse w/ coughing.  She was seen in ED earlier in day, had negative CXR & was d/c'ed on albuterol.  Pt returned to ER due to persistent SOB. She denies any fevers, chills abd pain, N, V.     ED course: T 98.3, , /81, RR 20, O2 100% RA.  Labs remarkable for WBC 16.7.  Received 1L NS, DuoNeb, prednisone 40mg, percocet. 27F hx asthma p/w non-productive cough, SOB, R-sided CP for 1-2 days.  Pt reports dry cough, nasal congestion, SOB that started on 4/8.  She went to an urgent care center  where she was started on steroid taper (?methylprednisone), amoxicillin & anti-tussives.  Pt reports that the cough became worse that night & she developed pain under her R-breast.  Due to persistent SOB & the CP, she came to ED on 4/9. She had CXR that was negative & was d/c'ed on albuterol. Pt returned to ER the same night due to persistent SOB & CP. She denies any fevers, chills abd pain, N, V.  Her brother who lives at home w/ her has been having a cough & she believes he might have been diagnosed w/ the flu.  Pt was diagnosed w/ asthma based on clinical sx at age 17.  She has been hospitalized for exacerbation once every 1-2 years.  She has never been intubated before.     ED course: T 98.3, , /81, RR 20, O2 100% RA.  Labs remarkable for WBC 16.7.  Received 1L NS, DuoNeb, prednisone 40mg, percocet. 27F hx asthma p/w non-productive cough, SOB, R-sided CP for 1-2 days.  Pt reports dry cough, nasal congestion, SOB that started on 4/8.  She went to an urgent care center  where she was started on steroid taper (?methylprednisone), amoxicillin & anti-tussives.  Pt reports that the cough became worse that night & she developed pain under her R-breast.  Due to persistent SOB & the CP, she came to ED on 4/9. She had CXR that was negative & was d/c'ed on naproxen. Pt returned to ER the same night due to persistent SOB & CP. She denies any fevers, chills abd pain, N, V.  Her brother who lives at home w/ her has been having a cough & she believes he might have been diagnosed w/ the flu.  Pt was diagnosed w/ asthma based on clinical sx at age 17.  She has been hospitalized for exacerbation once every 1-2 years.  She has never been intubated before.     ED course: T 98.3, , /81, RR 20, O2 100% RA.  Labs remarkable for WBC 16.7, D-dimer 153.  Received 1L NS, DuoNeb, prednisone 40mg, percocet. 27F hx asthma p/w non-productive cough, SOB, R-sided CP for 1-2 days.  Pt reports dry cough, nasal congestion, SOB that started on 4/8.  She went to an urgent care center  where she was started on steroid taper (?methylprednisone), amoxicillin & anti-tussives.  Pt reports that the cough became worse that night & she developed pain under her R-breast.  Due to persistent SOB & the CP, she came to ED on 4/9. She had CXR that was negative & was d/c'ed on naproxen. Pt returned to ER the same night due to persistent SOB & CP. She denies any fevers, chills abd pain, N, V.  Her brother who lives at home w/ her has been having a cough & she believes he might have been diagnosed w/ the flu.      Pt was diagnosed w/ asthma based on clinical sx at age 17.  She has been hospitalized for exacerbation once every 1-2 years.  She has never been intubated before.  She typically uses albuterol PRN 1-2/week & is not on any maintenance meds.    ED course: T 98.3, , /81, RR 20, O2 100% RA.  Labs remarkable for WBC 16.7, D-dimer 153.  Received 1L NS, DuoNeb, prednisone 40mg, percocet. 28yo Female hx asthma p/w non-productive cough, SOB, R-sided CP for 1-2 days.  Pt reports dry cough, nasal congestion, SOB that started on 4/8.  She went to an urgent care center  where she was started on steroid taper (?methylprednisone), amoxicillin & anti-tussives.  Pt reports that the cough became worse that night & she developed pain under her R-breast.  Due to persistent SOB & the CP, she came to ED on 4/9. She had CXR that was negative & was d/c'ed on naproxen. Pt returned to ER the same night due to persistent SOB & CP. She denies any fevers, chills abd pain, N, V.  Her brother who lives at home w/ her has been having a cough & she believes he might have been diagnosed w/ the flu.      Pt was diagnosed w/ asthma based on clinical sx at age 17.  She has been hospitalized for exacerbation once every 1-2 years.  She has never been intubated before.  She typically uses albuterol PRN 1-2/week & is not on any maintenance meds.    ED course: T 98.3, , /81, RR 20, O2 100% RA.  Labs remarkable for WBC 16.7, D-dimer 153.  Received 1L NS, DuoNeb, prednisone 40mg, percocet. 26yo Female hx asthma p/w non-productive cough, SOB, R-sided CP for 1-2 days.  Pt reports dry cough, nasal congestion, SOB that started on 4/8.  She went to an urgent care center  where she was started on steroid taper (?methylprednisone), amoxicillin & anti-tussives.  Pt reports that the cough became worse that night & she developed pain under her R-breast.  Due to persistent SOB & the CP, she came to ED on 4/9. She had CXR that was negative & was d/c'ed on naproxen. Pt returned to ER the same night due to persistent SOB & CP. She denies any fevers, chills abd pain, N, V.  Her brother who lives at home w/ her has been having a cough & she believes he might have been diagnosed w/ the flu. Pt was diagnosed w/ asthma based on clinical sx at age 17.  She has been hospitalized for exacerbation once every 1-2 years.  She has never been intubated before.  She typically uses albuterol PRN 1-2/week. Of note, the pt says that she has Advair at home but uses it "sometimes", did not know that it is a daily medication.     ED course: T 98.3, , /81, RR 20, O2 100% RA.  Labs remarkable for WBC 16.7, D-dimer 153.  Received 1L NS, DuoNeb, prednisone 40mg, percocet. 26yo Female hx asthma p/w non-productive cough, SOB, R-sided CP for 1-2 days.  Pt reports dry cough, nasal congestion, SOB that started on 4/8.  She went to an urgent care center  where she was started on steroid taper (?methylprednisone), amoxicillin & anti-tussives.  Pt reports that the cough became worse that night & she developed pain under her R-breast.  Due to persistent SOB & the CP, she came to ED on 4/9. She had CXR that was negative & was d/c'ed on naproxen. Pt returned to ER the same night due to persistent SOB & CP. She denies any fevers, chills abd pain, N, V.  Her brother who lives at home w/ her has been having a cough & she believes he might have been diagnosed w/ the flu. Pt was diagnosed w/ asthma based on clinical sx at age 17.  She has been hospitalized for exacerbation once every 1-2 years.  She has never been intubated before.  She typically uses albuterol PRN 1-2/week. Of note, the pt says that she has Advair at home but uses it "sometimes", did not know that it is a daily medication. Unable to recall the name of her PMD. The pt is in menstruation period currently.    ED course: T 98.3, , /81, RR 20, O2 100% RA.  Labs remarkable for WBC 16.7, D-dimer 153.  Received 1L NS, DuoNeb, prednisone 40mg, percocet.

## 2017-04-11 VITALS
HEART RATE: 75 BPM | RESPIRATION RATE: 18 BRPM | TEMPERATURE: 97 F | OXYGEN SATURATION: 98 % | SYSTOLIC BLOOD PRESSURE: 108 MMHG | DIASTOLIC BLOOD PRESSURE: 78 MMHG

## 2017-04-11 PROCEDURE — 99239 HOSP IP/OBS DSCHRG MGMT >30: CPT

## 2017-04-11 RX ORDER — IPRATROPIUM/ALBUTEROL SULFATE 18-103MCG
3 AEROSOL WITH ADAPTER (GRAM) INHALATION
Qty: 30 | Refills: 1 | OUTPATIENT
Start: 2017-04-11

## 2017-04-11 RX ORDER — ALBUTEROL 90 UG/1
2 AEROSOL, METERED ORAL
Qty: 1 | Refills: 1 | OUTPATIENT
Start: 2017-04-11

## 2017-04-11 RX ORDER — ALBUTEROL 90 UG/1
2 AEROSOL, METERED ORAL
Qty: 1 | Refills: 2 | OUTPATIENT
Start: 2017-04-11

## 2017-04-11 RX ORDER — BUDESONIDE AND FORMOTEROL FUMARATE DIHYDRATE 160; 4.5 UG/1; UG/1
2 AEROSOL RESPIRATORY (INHALATION)
Qty: 1 | Refills: 1 | OUTPATIENT
Start: 2017-04-11

## 2017-04-11 RX ORDER — DOCUSATE SODIUM 100 MG
100 CAPSULE ORAL DAILY
Qty: 0 | Refills: 0 | Status: DISCONTINUED | OUTPATIENT
Start: 2017-04-11 | End: 2017-04-11

## 2017-04-11 RX ORDER — FLUTICASONE PROPIONATE AND SALMETEROL 50; 250 UG/1; UG/1
1 POWDER ORAL; RESPIRATORY (INHALATION)
Qty: 1 | Refills: 1 | OUTPATIENT
Start: 2017-04-11

## 2017-04-11 RX ORDER — ALBUTEROL 90 UG/1
2 AEROSOL, METERED ORAL
Qty: 0 | Refills: 0 | COMMUNITY

## 2017-04-11 RX ORDER — FLUTICASONE PROPIONATE AND SALMETEROL 50; 250 UG/1; UG/1
1 POWDER ORAL; RESPIRATORY (INHALATION)
Qty: 0 | Refills: 0 | COMMUNITY

## 2017-04-11 RX ADMIN — Medication 3 MILLILITER(S): at 09:15

## 2017-04-11 RX ADMIN — Medication 3 MILLILITER(S): at 01:10

## 2017-04-11 RX ADMIN — Medication 100 MILLIGRAM(S): at 01:21

## 2017-04-11 RX ADMIN — Medication 100 MILLIGRAM(S): at 12:02

## 2017-04-11 RX ADMIN — Medication 3 MILLILITER(S): at 12:49

## 2017-04-11 RX ADMIN — Medication 3 MILLILITER(S): at 05:04

## 2017-04-11 RX ADMIN — BUDESONIDE AND FORMOTEROL FUMARATE DIHYDRATE 2 PUFF(S): 160; 4.5 AEROSOL RESPIRATORY (INHALATION) at 09:15

## 2017-04-11 RX ADMIN — Medication 40 MILLIGRAM(S): at 05:56

## 2017-04-11 RX ADMIN — LIDOCAINE 1 PATCH: 4 CREAM TOPICAL at 11:03

## 2017-11-20 ENCOUNTER — EMERGENCY (EMERGENCY)
Facility: HOSPITAL | Age: 27
LOS: 1 days | Discharge: HOME | End: 2017-11-20
Attending: EMERGENCY MEDICINE | Admitting: EMERGENCY MEDICINE
Payer: COMMERCIAL

## 2017-11-20 VITALS
HEART RATE: 87 BPM | RESPIRATION RATE: 16 BRPM | DIASTOLIC BLOOD PRESSURE: 72 MMHG | SYSTOLIC BLOOD PRESSURE: 108 MMHG | TEMPERATURE: 98 F | OXYGEN SATURATION: 98 %

## 2017-11-20 VITALS
RESPIRATION RATE: 18 BRPM | SYSTOLIC BLOOD PRESSURE: 119 MMHG | OXYGEN SATURATION: 99 % | HEART RATE: 107 BPM | DIASTOLIC BLOOD PRESSURE: 68 MMHG | TEMPERATURE: 98 F

## 2017-11-20 DIAGNOSIS — Z98.891 HISTORY OF UTERINE SCAR FROM PREVIOUS SURGERY: Chronic | ICD-10-CM

## 2017-11-20 LAB
ALBUMIN SERPL ELPH-MCNC: 4 G/DL — SIGNIFICANT CHANGE UP (ref 3.3–5)
ALP SERPL-CCNC: 52 U/L — SIGNIFICANT CHANGE UP (ref 40–120)
ALT FLD-CCNC: 16 U/L — SIGNIFICANT CHANGE UP (ref 4–33)
APPEARANCE UR: SIGNIFICANT CHANGE UP
APTT BLD: 33 SEC — SIGNIFICANT CHANGE UP (ref 27.5–37.4)
AST SERPL-CCNC: 16 U/L — SIGNIFICANT CHANGE UP (ref 4–32)
BACTERIA # UR AUTO: SIGNIFICANT CHANGE UP
BASOPHILS # BLD AUTO: 0.03 K/UL — SIGNIFICANT CHANGE UP (ref 0–0.2)
BASOPHILS NFR BLD AUTO: 0.3 % — SIGNIFICANT CHANGE UP (ref 0–2)
BILIRUB SERPL-MCNC: 0.3 MG/DL — SIGNIFICANT CHANGE UP (ref 0.2–1.2)
BILIRUB UR-MCNC: NEGATIVE — SIGNIFICANT CHANGE UP
BLD GP AB SCN SERPL QL: NEGATIVE — SIGNIFICANT CHANGE UP
BLOOD UR QL VISUAL: HIGH
BUN SERPL-MCNC: 8 MG/DL — SIGNIFICANT CHANGE UP (ref 7–23)
CALCIUM SERPL-MCNC: 9.1 MG/DL — SIGNIFICANT CHANGE UP (ref 8.4–10.5)
CHLORIDE SERPL-SCNC: 100 MMOL/L — SIGNIFICANT CHANGE UP (ref 98–107)
CO2 SERPL-SCNC: 20 MMOL/L — LOW (ref 22–31)
COLOR SPEC: YELLOW — SIGNIFICANT CHANGE UP
CREAT SERPL-MCNC: 0.58 MG/DL — SIGNIFICANT CHANGE UP (ref 0.5–1.3)
EOSINOPHIL # BLD AUTO: 0.09 K/UL — SIGNIFICANT CHANGE UP (ref 0–0.5)
EOSINOPHIL NFR BLD AUTO: 0.8 % — SIGNIFICANT CHANGE UP (ref 0–6)
GLUCOSE SERPL-MCNC: 122 MG/DL — HIGH (ref 70–99)
GLUCOSE UR-MCNC: NEGATIVE — SIGNIFICANT CHANGE UP
HCG SERPL-ACNC: SIGNIFICANT CHANGE UP MIU/ML
HCT VFR BLD CALC: 40.7 % — SIGNIFICANT CHANGE UP (ref 34.5–45)
HGB BLD-MCNC: 13.8 G/DL — SIGNIFICANT CHANGE UP (ref 11.5–15.5)
IMM GRANULOCYTES # BLD AUTO: 0.03 # — SIGNIFICANT CHANGE UP
IMM GRANULOCYTES NFR BLD AUTO: 0.3 % — SIGNIFICANT CHANGE UP (ref 0–1.5)
INR BLD: 1.05 — SIGNIFICANT CHANGE UP (ref 0.88–1.17)
KETONES UR-MCNC: SIGNIFICANT CHANGE UP
LEUKOCYTE ESTERASE UR-ACNC: NEGATIVE — SIGNIFICANT CHANGE UP
LYMPHOCYTES # BLD AUTO: 2.45 K/UL — SIGNIFICANT CHANGE UP (ref 1–3.3)
LYMPHOCYTES # BLD AUTO: 21.4 % — SIGNIFICANT CHANGE UP (ref 13–44)
MCHC RBC-ENTMCNC: 26 PG — LOW (ref 27–34)
MCHC RBC-ENTMCNC: 33.9 % — SIGNIFICANT CHANGE UP (ref 32–36)
MCV RBC AUTO: 76.6 FL — LOW (ref 80–100)
MONOCYTES # BLD AUTO: 0.56 K/UL — SIGNIFICANT CHANGE UP (ref 0–0.9)
MONOCYTES NFR BLD AUTO: 4.9 % — SIGNIFICANT CHANGE UP (ref 2–14)
MUCOUS THREADS # UR AUTO: SIGNIFICANT CHANGE UP
NEUTROPHILS # BLD AUTO: 8.3 K/UL — HIGH (ref 1.8–7.4)
NEUTROPHILS NFR BLD AUTO: 72.3 % — SIGNIFICANT CHANGE UP (ref 43–77)
NITRITE UR-MCNC: NEGATIVE — SIGNIFICANT CHANGE UP
NRBC # FLD: 0 — SIGNIFICANT CHANGE UP
PH UR: 6.5 — SIGNIFICANT CHANGE UP (ref 4.6–8)
PLATELET # BLD AUTO: 246 K/UL — SIGNIFICANT CHANGE UP (ref 150–400)
PMV BLD: 10.5 FL — SIGNIFICANT CHANGE UP (ref 7–13)
POTASSIUM SERPL-MCNC: 3.9 MMOL/L — SIGNIFICANT CHANGE UP (ref 3.5–5.3)
POTASSIUM SERPL-SCNC: 3.9 MMOL/L — SIGNIFICANT CHANGE UP (ref 3.5–5.3)
PROT SERPL-MCNC: 7.5 G/DL — SIGNIFICANT CHANGE UP (ref 6–8.3)
PROT UR-MCNC: 30 — HIGH
PROTHROM AB SERPL-ACNC: 11.8 SEC — SIGNIFICANT CHANGE UP (ref 9.8–13.1)
RBC # BLD: 5.31 M/UL — HIGH (ref 3.8–5.2)
RBC # FLD: 14.6 % — HIGH (ref 10.3–14.5)
RBC CASTS # UR COMP ASSIST: >50 — HIGH (ref 0–?)
RH IG SCN BLD-IMP: NEGATIVE — SIGNIFICANT CHANGE UP
SODIUM SERPL-SCNC: 137 MMOL/L — SIGNIFICANT CHANGE UP (ref 135–145)
SP GR SPEC: 1.01 — SIGNIFICANT CHANGE UP (ref 1–1.03)
SQUAMOUS # UR AUTO: SIGNIFICANT CHANGE UP
UROBILINOGEN FLD QL: NORMAL E.U. — SIGNIFICANT CHANGE UP (ref 0.1–0.2)
WBC # BLD: 11.46 K/UL — HIGH (ref 3.8–10.5)
WBC # FLD AUTO: 11.46 K/UL — HIGH (ref 3.8–10.5)
WBC UR QL: HIGH (ref 0–?)

## 2017-11-20 PROCEDURE — 99284 EMERGENCY DEPT VISIT MOD MDM: CPT

## 2017-11-20 PROCEDURE — 76830 TRANSVAGINAL US NON-OB: CPT | Mod: 26

## 2017-11-20 PROCEDURE — 76705 ECHO EXAM OF ABDOMEN: CPT | Mod: 26

## 2017-11-20 NOTE — ED ADULT TRIAGE NOTE - CHIEF COMPLAINT QUOTE
Pt 6 weeks pregnant c/o vaginal spotting. Pt reports mild RUQ abdominal pain, denies dizziness, SOB.

## 2017-11-20 NOTE — ED PROVIDER NOTE - OBJECTIVE STATEMENT
26 y/o female, 6 weeks pregnant by dates,  with 2 terminations, p/w vaginal bleeding and abd cramping. Sxs began this afternoon. Pt notes bright blood. No trauma, no fever, no chills, and no urinary complaints. No changes in medication. No domestic violence.

## 2017-11-20 NOTE — ED ADULT TRIAGE NOTE - TEMPERATURE IN FAHRENHEIT (DEGREES F)
Pt called with symptoms chest pain, more so when breathing, and was unsure if it was heart related. Pt transferred to CC RN for Triage.   98.4

## 2017-11-30 ENCOUNTER — APPOINTMENT (OUTPATIENT)
Dept: MATERNAL FETAL MEDICINE | Facility: CLINIC | Age: 27
End: 2017-11-30
Payer: COMMERCIAL

## 2017-11-30 ENCOUNTER — ASOB RESULT (OUTPATIENT)
Age: 27
End: 2017-11-30

## 2017-11-30 PROCEDURE — G0108 DIAB MANAGE TRN  PER INDIV: CPT

## 2017-12-04 ENCOUNTER — OTHER (OUTPATIENT)
Age: 27
End: 2017-12-04

## 2017-12-04 LAB — HBA1C MFR BLD HPLC: 6.9 %

## 2017-12-08 ENCOUNTER — ASOB RESULT (OUTPATIENT)
Age: 27
End: 2017-12-08

## 2017-12-08 ENCOUNTER — APPOINTMENT (OUTPATIENT)
Dept: ANTEPARTUM | Facility: CLINIC | Age: 27
End: 2017-12-08
Payer: COMMERCIAL

## 2017-12-08 ENCOUNTER — APPOINTMENT (OUTPATIENT)
Dept: MATERNAL FETAL MEDICINE | Facility: CLINIC | Age: 27
End: 2017-12-08
Payer: COMMERCIAL

## 2017-12-08 VITALS
SYSTOLIC BLOOD PRESSURE: 110 MMHG | BODY MASS INDEX: 38.24 KG/M2 | HEIGHT: 56 IN | HEART RATE: 76 BPM | WEIGHT: 170 LBS | DIASTOLIC BLOOD PRESSURE: 68 MMHG

## 2017-12-08 DIAGNOSIS — Z82.49 FAMILY HISTORY OF ISCHEMIC HEART DISEASE AND OTHER DISEASES OF THE CIRCULATORY SYSTEM: ICD-10-CM

## 2017-12-08 DIAGNOSIS — Z78.9 OTHER SPECIFIED HEALTH STATUS: ICD-10-CM

## 2017-12-08 DIAGNOSIS — Z80.7 FAMILY HISTORY OF OTHER MALIGNANT NEOPLASMS OF LYMPHOID, HEMATOPOIETIC AND RELATED TISSUES: ICD-10-CM

## 2017-12-08 DIAGNOSIS — Z83.3 FAMILY HISTORY OF DIABETES MELLITUS: ICD-10-CM

## 2017-12-08 PROCEDURE — 99243 OFF/OP CNSLTJ NEW/EST LOW 30: CPT

## 2017-12-08 PROCEDURE — 76817 TRANSVAGINAL US OBSTETRIC: CPT

## 2017-12-08 RX ORDER — VALACYCLOVIR 1 G/1
1 TABLET, FILM COATED ORAL
Qty: 10 | Refills: 0 | Status: COMPLETED | COMMUNITY
Start: 2017-11-13

## 2017-12-08 RX ORDER — CEPHALEXIN 500 MG/1
500 CAPSULE ORAL
Qty: 14 | Refills: 0 | Status: COMPLETED | COMMUNITY
Start: 2017-11-10

## 2017-12-08 RX ORDER — NORETHINDRONE ACETATE AND ETHINYL ESTRADIOL, ETHINYL ESTRADIOL AND FERROUS FUMARATE 1MG-10(24)
1 MG-10 MCG / KIT ORAL
Qty: 84 | Refills: 0 | Status: COMPLETED | COMMUNITY
Start: 2017-10-24

## 2017-12-08 RX ORDER — ACYCLOVIR 50 MG/G
5 OINTMENT TOPICAL
Qty: 30 | Refills: 0 | Status: COMPLETED | COMMUNITY
Start: 2017-11-13

## 2017-12-14 ENCOUNTER — APPOINTMENT (OUTPATIENT)
Dept: MATERNAL FETAL MEDICINE | Facility: CLINIC | Age: 27
End: 2017-12-14

## 2017-12-21 ENCOUNTER — APPOINTMENT (OUTPATIENT)
Dept: MATERNAL FETAL MEDICINE | Facility: CLINIC | Age: 27
End: 2017-12-21
Payer: COMMERCIAL

## 2017-12-21 ENCOUNTER — ASOB RESULT (OUTPATIENT)
Age: 27
End: 2017-12-21

## 2017-12-21 DIAGNOSIS — O24.419 GESTATIONAL DIABETES MELLITUS IN PREGNANCY, UNSPECIFIED CONTROL: ICD-10-CM

## 2017-12-21 PROCEDURE — G0108 DIAB MANAGE TRN  PER INDIV: CPT

## 2017-12-28 ENCOUNTER — OTHER (OUTPATIENT)
Age: 27
End: 2017-12-28

## 2018-01-03 ENCOUNTER — APPOINTMENT (OUTPATIENT)
Dept: ANTEPARTUM | Facility: CLINIC | Age: 28
End: 2018-01-03
Payer: COMMERCIAL

## 2018-01-03 ENCOUNTER — ASOB RESULT (OUTPATIENT)
Age: 28
End: 2018-01-03

## 2018-01-03 PROCEDURE — 76813 OB US NUCHAL MEAS 1 GEST: CPT

## 2018-01-03 PROCEDURE — 76801 OB US < 14 WKS SINGLE FETUS: CPT

## 2018-01-11 ENCOUNTER — APPOINTMENT (OUTPATIENT)
Dept: MATERNAL FETAL MEDICINE | Facility: CLINIC | Age: 28
End: 2018-01-11
Payer: COMMERCIAL

## 2018-01-11 ENCOUNTER — ASOB RESULT (OUTPATIENT)
Age: 28
End: 2018-01-11

## 2018-01-11 ENCOUNTER — APPOINTMENT (OUTPATIENT)
Dept: ANTEPARTUM | Facility: CLINIC | Age: 28
End: 2018-01-11
Payer: COMMERCIAL

## 2018-01-11 VITALS
BODY MASS INDEX: 36.98 KG/M2 | HEART RATE: 72 BPM | OXYGEN SATURATION: 98 % | DIASTOLIC BLOOD PRESSURE: 62 MMHG | WEIGHT: 164.38 LBS | RESPIRATION RATE: 18 BRPM | SYSTOLIC BLOOD PRESSURE: 100 MMHG | HEIGHT: 56 IN

## 2018-01-11 DIAGNOSIS — J45.909 UNSPECIFIED ASTHMA, UNCOMPLICATED: ICD-10-CM

## 2018-01-11 PROCEDURE — 99242 OFF/OP CONSLTJ NEW/EST SF 20: CPT

## 2018-01-11 PROCEDURE — 76801 OB US < 14 WKS SINGLE FETUS: CPT

## 2018-01-11 RX ORDER — FLUTICASONE PROPIONATE AND SALMETEROL 50; 100 UG/1; UG/1
100-50 POWDER RESPIRATORY (INHALATION)
Refills: 0 | Status: DISCONTINUED | COMMUNITY
Start: 2017-12-08 | End: 2018-01-11

## 2018-01-29 ENCOUNTER — RX RENEWAL (OUTPATIENT)
Age: 28
End: 2018-01-29

## 2018-02-02 ENCOUNTER — RX RENEWAL (OUTPATIENT)
Age: 28
End: 2018-02-02

## 2018-02-05 ENCOUNTER — MEDICATION RENEWAL (OUTPATIENT)
Age: 28
End: 2018-02-05

## 2018-02-08 ENCOUNTER — ASOB RESULT (OUTPATIENT)
Age: 28
End: 2018-02-08

## 2018-02-08 ENCOUNTER — APPOINTMENT (OUTPATIENT)
Dept: MATERNAL FETAL MEDICINE | Facility: CLINIC | Age: 28
End: 2018-02-08
Payer: COMMERCIAL

## 2018-02-08 VITALS — BODY MASS INDEX: 37.66 KG/M2 | WEIGHT: 167.4 LBS | HEIGHT: 56 IN

## 2018-02-08 PROCEDURE — G0108 DIAB MANAGE TRN  PER INDIV: CPT

## 2018-03-01 ENCOUNTER — APPOINTMENT (OUTPATIENT)
Dept: MATERNAL FETAL MEDICINE | Facility: CLINIC | Age: 28
End: 2018-03-01
Payer: COMMERCIAL

## 2018-03-01 ENCOUNTER — APPOINTMENT (OUTPATIENT)
Dept: ANTEPARTUM | Facility: CLINIC | Age: 28
End: 2018-03-01
Payer: COMMERCIAL

## 2018-03-01 ENCOUNTER — ASOB RESULT (OUTPATIENT)
Age: 28
End: 2018-03-01

## 2018-03-01 VITALS
HEART RATE: 90 BPM | WEIGHT: 168 LBS | RESPIRATION RATE: 16 BRPM | SYSTOLIC BLOOD PRESSURE: 106 MMHG | OXYGEN SATURATION: 97 % | DIASTOLIC BLOOD PRESSURE: 64 MMHG | BODY MASS INDEX: 37.67 KG/M2

## 2018-03-01 PROCEDURE — 76811 OB US DETAILED SNGL FETUS: CPT

## 2018-03-01 PROCEDURE — 99242 OFF/OP CONSLTJ NEW/EST SF 20: CPT

## 2018-03-15 ENCOUNTER — APPOINTMENT (OUTPATIENT)
Dept: MATERNAL FETAL MEDICINE | Facility: CLINIC | Age: 28
End: 2018-03-15
Payer: COMMERCIAL

## 2018-03-15 ENCOUNTER — ASOB RESULT (OUTPATIENT)
Age: 28
End: 2018-03-15

## 2018-03-15 VITALS — BODY MASS INDEX: 38.24 KG/M2 | HEIGHT: 56 IN | WEIGHT: 170 LBS

## 2018-03-15 PROCEDURE — G0108 DIAB MANAGE TRN  PER INDIV: CPT

## 2018-04-12 ENCOUNTER — ASOB RESULT (OUTPATIENT)
Age: 28
End: 2018-04-12

## 2018-04-12 ENCOUNTER — APPOINTMENT (OUTPATIENT)
Dept: MATERNAL FETAL MEDICINE | Facility: CLINIC | Age: 28
End: 2018-04-12
Payer: COMMERCIAL

## 2018-04-12 ENCOUNTER — APPOINTMENT (OUTPATIENT)
Dept: ANTEPARTUM | Facility: CLINIC | Age: 28
End: 2018-04-12
Payer: COMMERCIAL

## 2018-04-12 VITALS
DIASTOLIC BLOOD PRESSURE: 62 MMHG | WEIGHT: 172 LBS | SYSTOLIC BLOOD PRESSURE: 104 MMHG | RESPIRATION RATE: 16 BRPM | OXYGEN SATURATION: 98 % | BODY MASS INDEX: 38.69 KG/M2 | HEART RATE: 92 BPM | HEIGHT: 56 IN

## 2018-04-12 PROCEDURE — 99243 OFF/OP CNSLTJ NEW/EST LOW 30: CPT

## 2018-04-12 PROCEDURE — 76816 OB US FOLLOW-UP PER FETUS: CPT

## 2018-04-17 ENCOUNTER — OUTPATIENT (OUTPATIENT)
Dept: OUTPATIENT SERVICES | Age: 28
LOS: 1 days | Discharge: HOME | End: 2018-04-17

## 2018-04-17 DIAGNOSIS — Z98.891 HISTORY OF UTERINE SCAR FROM PREVIOUS SURGERY: Chronic | ICD-10-CM

## 2018-04-19 ENCOUNTER — APPOINTMENT (OUTPATIENT)
Dept: PEDIATRIC CARDIOLOGY | Facility: CLINIC | Age: 28
End: 2018-04-19
Payer: COMMERCIAL

## 2018-04-19 PROCEDURE — 93325 DOPPLER ECHO COLOR FLOW MAPG: CPT | Mod: 59

## 2018-04-19 PROCEDURE — 99241 OFFICE CONSULTATION NEW/ESTAB PATIENT 15 MIN: CPT | Mod: 25

## 2018-04-19 PROCEDURE — 76827 ECHO EXAM OF FETAL HEART: CPT

## 2018-04-19 PROCEDURE — 76825 ECHO EXAM OF FETAL HEART: CPT

## 2018-04-19 PROCEDURE — 76820 UMBILICAL ARTERY ECHO: CPT

## 2018-05-03 ENCOUNTER — APPOINTMENT (OUTPATIENT)
Dept: MATERNAL FETAL MEDICINE | Facility: CLINIC | Age: 28
End: 2018-05-03
Payer: COMMERCIAL

## 2018-05-03 ENCOUNTER — ASOB RESULT (OUTPATIENT)
Age: 28
End: 2018-05-03

## 2018-05-03 VITALS — WEIGHT: 174.56 LBS | HEIGHT: 56 IN | BODY MASS INDEX: 39.27 KG/M2

## 2018-05-03 PROCEDURE — G0108 DIAB MANAGE TRN  PER INDIV: CPT

## 2018-05-03 RX ORDER — METFORMIN ER 750 MG 750 MG/1
750 TABLET ORAL
Qty: 60 | Refills: 2 | Status: DISCONTINUED | COMMUNITY
Start: 2017-12-08 | End: 2018-05-03

## 2018-05-03 RX ORDER — METFORMIN ER 750 MG 750 MG/1
750 TABLET ORAL
Qty: 60 | Refills: 2 | Status: DISCONTINUED | COMMUNITY
Start: 2018-01-29 | End: 2018-05-03

## 2018-05-04 LAB — HBA1C MFR BLD HPLC: 5.3 %

## 2018-05-07 RX ORDER — GLYBURIDE 5 MG/1
5 TABLET ORAL
Qty: 180 | Refills: 0 | Status: COMPLETED | COMMUNITY
Start: 2018-05-03 | End: 2018-05-07

## 2018-05-07 RX ORDER — URINE ACETONE TEST STRIPS
STRIP MISCELLANEOUS
Qty: 2 | Refills: 1 | Status: COMPLETED | COMMUNITY
Start: 2017-11-30 | End: 2018-05-07

## 2018-05-07 RX ORDER — ASCORBIC ACID, CALCIUM CITRATE, IRON, VITAMIN D, DL- ALPHA- TOCOPHEROL ACETATE, THIAMINE, RIBOFLAVIN, NIACINAMIDE, PYRIDOXINE HYDROCHLORIDE, FOLIC ACID, IODINE, ZINC, COPPER, DOCUSATE SODIUM, DOCONEXENT AND ICOSAPENT
35-1 & 300 KIT
Qty: 180 | Refills: 0 | Status: COMPLETED | COMMUNITY
Start: 2017-11-13 | End: 2018-05-07

## 2018-05-07 RX ORDER — BLOOD SUGAR DIAGNOSTIC
STRIP MISCELLANEOUS 4 TIMES DAILY
Qty: 4 | Refills: 2 | Status: COMPLETED | COMMUNITY
Start: 2017-11-30 | End: 2018-05-07

## 2018-05-07 RX ORDER — LANCETS 33 GAUGE
EACH MISCELLANEOUS
Qty: 4 | Refills: 2 | Status: COMPLETED | COMMUNITY
Start: 2017-11-30 | End: 2018-05-07

## 2018-05-07 RX ORDER — ALBUTEROL SULFATE 2.5 MG/3ML
(2.5 MG/3ML) SOLUTION RESPIRATORY (INHALATION)
Refills: 0 | Status: COMPLETED | COMMUNITY
Start: 2017-12-08 | End: 2018-05-07

## 2018-05-17 ENCOUNTER — APPOINTMENT (OUTPATIENT)
Dept: ANTEPARTUM | Facility: CLINIC | Age: 28
End: 2018-05-17

## 2018-05-21 ENCOUNTER — OTHER (OUTPATIENT)
Age: 28
End: 2018-05-21

## 2018-05-21 DIAGNOSIS — O24.415 GESTATIONAL DIABETES MELLITUS IN PREGNANCY, CONTROLLED BY ORAL HYPOGLYCEMIC DRUGS: ICD-10-CM

## 2018-05-21 RX ORDER — GLYBURIDE 2.5 MG/1
2.5 TABLET ORAL
Qty: 90 | Refills: 0 | Status: DISCONTINUED | COMMUNITY
Start: 2018-05-07 | End: 2018-05-21

## 2018-05-23 ENCOUNTER — OTHER (OUTPATIENT)
Age: 28
End: 2018-05-23

## 2018-05-23 RX ORDER — PEN NEEDLE, DIABETIC 29 G X1/2"
32G X 4 MM NEEDLE, DISPOSABLE MISCELLANEOUS
Qty: 2 | Refills: 2 | Status: DISCONTINUED | COMMUNITY
Start: 2018-05-21 | End: 2018-05-23

## 2018-05-23 RX ORDER — INSULIN HUMAN 100 [IU]/ML
100 INJECTION, SUSPENSION SUBCUTANEOUS
Qty: 8 | Refills: 0 | Status: DISCONTINUED | COMMUNITY
Start: 2018-05-21 | End: 2018-05-23

## 2018-05-24 ENCOUNTER — APPOINTMENT (OUTPATIENT)
Dept: MATERNAL FETAL MEDICINE | Facility: CLINIC | Age: 28
End: 2018-05-24
Payer: COMMERCIAL

## 2018-05-24 ENCOUNTER — ASOB RESULT (OUTPATIENT)
Age: 28
End: 2018-05-24

## 2018-05-24 ENCOUNTER — APPOINTMENT (OUTPATIENT)
Dept: ANTEPARTUM | Facility: CLINIC | Age: 28
End: 2018-05-24
Payer: COMMERCIAL

## 2018-05-24 VITALS
SYSTOLIC BLOOD PRESSURE: 98 MMHG | WEIGHT: 178.44 LBS | BODY MASS INDEX: 40.14 KG/M2 | HEIGHT: 56 IN | DIASTOLIC BLOOD PRESSURE: 62 MMHG | RESPIRATION RATE: 18 BRPM | OXYGEN SATURATION: 98 % | HEART RATE: 85 BPM

## 2018-05-24 PROCEDURE — 76821 MIDDLE CEREBRAL ARTERY ECHO: CPT

## 2018-05-24 PROCEDURE — 99243 OFF/OP CNSLTJ NEW/EST LOW 30: CPT

## 2018-05-24 PROCEDURE — 76819 FETAL BIOPHYS PROFIL W/O NST: CPT

## 2018-05-24 PROCEDURE — 93975 VASCULAR STUDY: CPT

## 2018-05-24 PROCEDURE — 76820 UMBILICAL ARTERY ECHO: CPT

## 2018-05-24 PROCEDURE — 76816 OB US FOLLOW-UP PER FETUS: CPT

## 2018-05-24 RX ORDER — PREDNISONE 10 MG/1
10 TABLET ORAL
Qty: 28 | Refills: 0 | Status: DISCONTINUED | COMMUNITY
Start: 2017-12-27

## 2018-05-24 RX ORDER — ALBUTEROL SULFATE 90 UG/1
108 (90 BASE) AEROSOL, METERED RESPIRATORY (INHALATION)
Qty: 9 | Refills: 0 | Status: ACTIVE | COMMUNITY
Start: 2018-02-06

## 2018-05-24 RX ORDER — BUDESONIDE 180 UG/1
180 AEROSOL, POWDER RESPIRATORY (INHALATION)
Qty: 3 | Refills: 0 | Status: ACTIVE | COMMUNITY
Start: 2018-02-14

## 2018-05-24 RX ORDER — ISOPROPYL ALCOHOL 70 ML/100ML
SWAB TOPICAL
Qty: 2 | Refills: 0 | Status: ACTIVE | COMMUNITY
Start: 2018-05-21 | End: 1900-01-01

## 2018-05-25 ENCOUNTER — OTHER (OUTPATIENT)
Age: 28
End: 2018-05-25

## 2018-05-25 ENCOUNTER — RX RENEWAL (OUTPATIENT)
Age: 28
End: 2018-05-25

## 2018-05-25 RX ORDER — GLYBURIDE 2.5 MG/1
2.5 TABLET ORAL
Refills: 0 | Status: DISCONTINUED | COMMUNITY
Start: 2018-05-24 | End: 2018-05-25

## 2018-05-25 RX ORDER — SYRING-NEEDL,DISP,INSUL,0.3 ML 31GX15/64"
31G X 15/64" SYRINGE, EMPTY DISPOSABLE MISCELLANEOUS
Qty: 1 | Refills: 0 | Status: ACTIVE | COMMUNITY
Start: 2018-05-23 | End: 1900-01-01

## 2018-06-01 ENCOUNTER — ASOB RESULT (OUTPATIENT)
Age: 28
End: 2018-06-01

## 2018-06-01 ENCOUNTER — APPOINTMENT (OUTPATIENT)
Dept: ANTEPARTUM | Facility: CLINIC | Age: 28
End: 2018-06-01
Payer: COMMERCIAL

## 2018-06-01 ENCOUNTER — APPOINTMENT (OUTPATIENT)
Dept: MATERNAL FETAL MEDICINE | Facility: CLINIC | Age: 28
End: 2018-06-01
Payer: COMMERCIAL

## 2018-06-01 ENCOUNTER — APPOINTMENT (OUTPATIENT)
Dept: MATERNAL FETAL MEDICINE | Facility: CLINIC | Age: 28
End: 2018-06-01

## 2018-06-01 VITALS — WEIGHT: 179.38 LBS | BODY MASS INDEX: 40.35 KG/M2 | HEIGHT: 56 IN

## 2018-06-01 PROCEDURE — 76818 FETAL BIOPHYS PROFILE W/NST: CPT

## 2018-06-01 PROCEDURE — G0108 DIAB MANAGE TRN  PER INDIV: CPT

## 2018-06-01 RX ORDER — HUMAN INSULIN 100 [IU]/ML
100 INJECTION, SUSPENSION SUBCUTANEOUS
Qty: 2 | Refills: 1 | Status: ACTIVE | COMMUNITY
Start: 2018-05-23 | End: 1900-01-01

## 2018-06-08 ENCOUNTER — APPOINTMENT (OUTPATIENT)
Dept: MATERNAL FETAL MEDICINE | Facility: CLINIC | Age: 28
End: 2018-06-08
Payer: COMMERCIAL

## 2018-06-08 ENCOUNTER — APPOINTMENT (OUTPATIENT)
Dept: ANTEPARTUM | Facility: CLINIC | Age: 28
End: 2018-06-08
Payer: COMMERCIAL

## 2018-06-08 ENCOUNTER — ASOB RESULT (OUTPATIENT)
Age: 28
End: 2018-06-08

## 2018-06-08 VITALS — BODY MASS INDEX: 41.17 KG/M2 | HEIGHT: 56 IN | WEIGHT: 183 LBS

## 2018-06-08 DIAGNOSIS — O24.414 GESTATIONAL DIABETES MELLITUS IN PREGNANCY, INSULIN CONTROLLED: ICD-10-CM

## 2018-06-08 DIAGNOSIS — O99.210 OBESITY COMPLICATING PREGNANCY, UNSPECIFIED TRIMESTER: ICD-10-CM

## 2018-06-08 PROCEDURE — G0108 DIAB MANAGE TRN  PER INDIV: CPT

## 2018-06-08 PROCEDURE — 76818 FETAL BIOPHYS PROFILE W/NST: CPT

## 2018-06-13 ENCOUNTER — ASOB RESULT (OUTPATIENT)
Age: 28
End: 2018-06-13

## 2018-06-13 ENCOUNTER — APPOINTMENT (OUTPATIENT)
Dept: ANTEPARTUM | Facility: CLINIC | Age: 28
End: 2018-06-13
Payer: COMMERCIAL

## 2018-06-13 PROCEDURE — 76819 FETAL BIOPHYS PROFIL W/O NST: CPT

## 2018-06-22 ENCOUNTER — APPOINTMENT (OUTPATIENT)
Dept: ANTEPARTUM | Facility: CLINIC | Age: 28
End: 2018-06-22
Payer: COMMERCIAL

## 2018-06-22 ENCOUNTER — ASOB RESULT (OUTPATIENT)
Age: 28
End: 2018-06-22

## 2018-06-22 PROCEDURE — 76819 FETAL BIOPHYS PROFIL W/O NST: CPT

## 2018-06-22 PROCEDURE — 93976 VASCULAR STUDY: CPT

## 2018-06-22 PROCEDURE — 76820 UMBILICAL ARTERY ECHO: CPT

## 2018-06-22 PROCEDURE — 76816 OB US FOLLOW-UP PER FETUS: CPT

## 2018-06-29 ENCOUNTER — APPOINTMENT (OUTPATIENT)
Dept: MATERNAL FETAL MEDICINE | Facility: CLINIC | Age: 28
End: 2018-06-29

## 2018-06-29 ENCOUNTER — ASOB RESULT (OUTPATIENT)
Age: 28
End: 2018-06-29

## 2018-06-29 ENCOUNTER — APPOINTMENT (OUTPATIENT)
Dept: ANTEPARTUM | Facility: CLINIC | Age: 28
End: 2018-06-29
Payer: COMMERCIAL

## 2018-06-29 PROCEDURE — 76818 FETAL BIOPHYS PROFILE W/NST: CPT

## 2018-07-06 ENCOUNTER — APPOINTMENT (OUTPATIENT)
Dept: ANTEPARTUM | Facility: CLINIC | Age: 28
End: 2018-07-06

## 2018-07-16 PROBLEM — Z33.2 ENCOUNTER FOR ELECTIVE TERMINATION OF PREGNANCY: Chronic | Status: ACTIVE | Noted: 2017-04-10

## 2019-11-05 NOTE — ED ADULT TRIAGE NOTE - CHIEF COMPLAINT QUOTE
p/t with hx asthma c/o of cough cold and chest congestion for few days p/t currently being treated for bronchitis 2 neb treatments given by ems Initial (On Arrival)

## 2020-04-30 NOTE — ED PROVIDER NOTE - PROGRESS NOTE DETAILS
Blood glucose AJM: Pt feeling improved. subchorionic hemorrhage but IUP visualized. pt given copy of results and encouraged to follow up with obgyn. pt comfortable with plan.   pt RH negative. will give rhogam

## 2022-01-21 NOTE — H&P ADULT. - PROBLEM SELECTOR PROBLEM 2
Pt speaks English and is ok today with English/No-Patient/Caregiver offered and refused free interpretation services. Chest pain Asthma exacerbation

## 2022-10-20 NOTE — ED ADULT TRIAGE NOTE - PAIN RATING/NUMBER SCALE (0-10): REST
Render In Strict Bullet Format?: No Detail Level: Zone Continue Regimen: Apply minoxidil compound to scalp nightly as directed - avoid drippage. Initiate Treatment: Wash scalp with ciclopirox shampoo 2 - 3 x weekly.\\n\\nBLOW DRY HAIR with low or cool air. Avoid airdrying. Initiate Treatment: Take Doxycycline 100mg twice daily with food and water for 10 days. Plan: Refer to ENT for future surgical managment. 8

## 2022-11-01 NOTE — ED PROVIDER NOTE - NS_EDPROVIDERDISPOUSERTYPE_ED_A_ED
bed rails Scribe Attestation (For Scribes USE Only)... Scribe Attestation (For Scribes USE Only).../Attending Attestation (For Attendings USE Only)...

## 2025-03-25 NOTE — PATIENT PROFILE ADULT. - NS PRO ABUSE SCREEN AFRAID ANYONE YN
Received report from Dania GAINES for continuity of care  Pt dc and all education given and gone over with pt. Pt has no further questions and self ambulating out of ed with all belongings and ppwrk. Verbal confirmation that pt understands  All information including refferals, resources, recomendations and  medications (if applicable) gone over with pateint.  Pt not with any current complaints and reports feeling great,   Care complete
no

## 2025-04-08 ENCOUNTER — APPOINTMENT (OUTPATIENT)
Dept: PULMONOLOGY | Facility: CLINIC | Age: 35
End: 2025-04-08